# Patient Record
Sex: FEMALE | Race: WHITE | URBAN - METROPOLITAN AREA
[De-identification: names, ages, dates, MRNs, and addresses within clinical notes are randomized per-mention and may not be internally consistent; named-entity substitution may affect disease eponyms.]

---

## 2017-06-03 ENCOUNTER — HOSPITAL ENCOUNTER (EMERGENCY)
Facility: CLINIC | Age: 22
Discharge: HOME | End: 2017-06-04
Attending: EMERGENCY MEDICINE | Admitting: EMERGENCY MEDICINE
Payer: COMMERCIAL

## 2017-06-03 DIAGNOSIS — L50.9 HIVES: ICD-10-CM

## 2017-06-03 PROCEDURE — 99283 EMERGENCY DEPT VISIT LOW MDM: CPT

## 2017-06-03 PROCEDURE — 87081 CULTURE SCREEN ONLY: CPT | Performed by: EMERGENCY MEDICINE

## 2017-06-03 PROCEDURE — 87880 STREP A ASSAY W/OPTIC: CPT | Performed by: EMERGENCY MEDICINE

## 2017-06-03 RX ORDER — DIPHENHYDRAMINE HCL 25 MG
25 CAPSULE ORAL ONCE
Status: COMPLETED | OUTPATIENT
Start: 2017-06-03 | End: 2017-06-04

## 2017-06-03 RX ORDER — BIOTIN 10000 MCG
CAPSULE ORAL
COMMUNITY

## 2017-06-03 ASSESSMENT — ENCOUNTER SYMPTOMS
JOINT SWELLING: 1
SORE THROAT: 1
ARTHRALGIAS: 1
TROUBLE SWALLOWING: 1

## 2017-06-03 NOTE — ED AVS SNAPSHOT
Emergency Department    [address] [place] [address]    Phone:  [phone]    Fax:  [phone]                                       Gemma Quispe   MRN: [id number]    Department:   Emergency Department   Date of Visit:  6/3/2017           After Visit Summary Signature Page     I have received my discharge instructions, and my questions have been answered. I have discussed any challenges I see with this plan with the nurse or doctor.    ..........................................................................................................................................  Patient/Patient Representative Signature      ..........................................................................................................................................  Patient Representative Print Name and Relationship to Patient    ..................................................               ................................................  Date                                            Time    ..........................................................................................................................................  Reviewed by Signature/Title    ...................................................              ..............................................  Date                                                            Time

## 2017-06-03 NOTE — ED AVS SNAPSHOT
Emergency Department    [address] [facility] [address]    Phone:  [phone]    Fax:  [phone]                                       Gemma Quispe   MRN: [id number]    Department:   Emergency Department   Date of Visit:  6/3/2017           Patient Information     Date Of Birth          1995        Your diagnoses for this visit were:     Hives        You were seen by Ramila Hathaway MD.      Follow-up Information     Follow up with Gemma Higuera NP. Schedule an appointment as soon as possible for a visit in 2 days.    Contact information:    [address] [id number] [phone]          Discharge Instructions       *You may resume diet and activities as tolerated.  Avoid known allergens.  *Take medications as prescribed.  Prednisone and zantac as directed.  Benadryl as directed as needed. Continue your current medications.  *If you develop symptoms of anaphylaxis (throat swelling, cough, difficulty in breathing, ligthheaded), inject with your Epi-pen and return to the emergency department immediately.  *Return if you become worse in any way.      Discharge Instructions  Hives or Urticaria    Hives are a rash with raised, swollen, red, itchy spots on the skin. They may look like mosquito bites. Hives often change in size, shape and location over time.  Hives can be caused by an allergic reaction to medicine, food, insect stings, or many other things. They can also come because of your own body s reaction to things like infections, body temperature changes, or pressure on the skin. Sometimes hives are caused by an inherited problem. Hives are not contagious.    Return to the Emergency Department if:    You have trouble breathing.    Your lips or tongue swell up, or you have swelling or tightness in the throat.    You have stomach pain or vomiting.    You pass out.    What can I do to help myself?    Fill any prescriptions the doctor gave you and  take them right away.    You may be given a prescription for a steroid to reduce inflammation. Used long-term, these can have many serious side effects, but for short courses these do not happen. You may notice restlessness or increased appetite. Be sure to take all of the medicine as prescribed.     You may be given a prescription for an antihistamine, or your doctor may tell you to use one available without a prescription, such as Benadryl  (diphenhydramine). These medicines relieve the itching and can help make the hives go away.    Sometimes your doctor will recommend an H1 antihistamine, such as Zantac  (ranitidine) or Pepcid  (famotidine). These are usually used for stomach problems, but also can help with hives.     Avoid scratching! This makes the hives get worse. You may want to put socks on your hands (or on your child s hands) when sleeping.    Avoid tight clothes, or clothes that rub on your skin.    If the itching is too bad, try cool compresses or cool baths. Avoid hot baths and showers, because they can make hives worse.    If you have a serious allergic reaction, you may be given an epinephrine shot (like EpiPen ) to use at home. Use this if you are exposed to the thing you are allergic to, and call an ambulance right away. This medicine is used to buy time to get to a hospital, but not to replace hospital care.     Follow up with your regular doctor:    If your hives aren t gone in 4 or 5 days.    If you keep getting hives.  If you were given a prescription for medicine here today, be sure to read all of the information (including the package insert) that comes with your prescription.  This will include important information about the medicine, its side effects, and any warnings that you need to know about.  The pharmacist who fills the prescription can provide more information and answer questions you may have about the medicine.  If you have questions or concerns that the pharmacist cannot  address, please call or return to the Emergency Department.   Opioid Medication Information    Pain medications are among the most commonly prescribed medicines, so we are including this information for all our patients. If you did not receive pain medication or get a prescription for pain medicine, you can ignore it.     You may have been given a prescription for an opioid (narcotic) pain medicine and/or have received a pain medicine while here in the Emergency Department. These medicines can make you drowsy or impaired. You must not drive, operate dangerous equipment, or engage in any other dangerous activities while taking these medications. If you drive while taking these medications, you could be arrested for DUI, or driving under the influence. Do not drink any alcohol while you are taking these medications.     Opioid pain medications can cause addiction. If you have a history of chemical dependency of any type, you are at a higher risk of becoming addicted to pain medications.  Only take these prescribed medications to treat your pain when all other options have been tried. Take it for as short a time and as few doses as possible. Store your pain pills in a secure place, as they are frequently stolen and provide a dangerous opportunity for children or visitors in your house to start abusing these powerful medications. We will not replace any lost or stolen medicine.  As soon as your pain is better, you should flush all your remaining medication.     Many prescription pain medications contain Tylenol  (acetaminophen), including Vicodin , Tylenol #3 , Norco , Lortab , and Percocet .  You should not take any extra pills of Tylenol  if you are using these prescription medications or you can get very sick.  Do not ever take more than 3000 mg of acetaminophen in any 24 hour period.    All opioids tend to cause constipation. Drink plenty of water and eat foods that have a lot of fiber, such as fruits, vegetables,  prune juice, apple juice and high fiber cereal.  Take a laxative if you don t move your bowels at least every other day. Miralax , Milk of Magnesia, Colace , or Senna  can be used to keep you regular.      Remember that you can always come back to the Emergency Department if you are not able to see your regular doctor in the amount of time listed above, if you get any new symptoms, or if there is anything that worries you.      24 Hour Appointment Hotline       To make an appointment at any [facility], call [phone] ([phone]). If you don't have a family doctor or clinic, we will help you find one. [facility] clinics are conveniently located to serve the needs of you and your family.             Review of your medicines      START taking        Dose / Directions Last dose taken    diphenhydrAMINE 25 MG tablet   Commonly known as:  BENADRYL   Dose:  25 mg   Quantity:  56 tablet        Take 1 tablet (25 mg) by mouth every 6 hours as needed for itching   Refills:  0        EPINEPHrine 0.3 MG/0.3ML injection   Dose:  0.3 mg   Quantity:  0.6 mL        Inject 0.3 mLs (0.3 mg) into the muscle once as needed for anaphylaxis   Refills:  1        predniSONE 20 MG tablet   Commonly known as:  DELTASONE   Quantity:  10 tablet        Take two tablets (= 40mg) each day for 5 (five) days   Refills:  0        ranitidine 150 MG tablet   Commonly known as:  ZANTAC   Dose:  150 mg   Quantity:  20 tablet        Take 1 tablet (150 mg) by mouth 2 times daily for 10 days   Refills:  0          Our records show that you are taking the medicines listed below. If these are incorrect, please call your family doctor or clinic.        Dose / Directions Last dose taken    Biotin 10 MG Caps        Refills:  0        Fish Flavor Liqd        Refills:  0        HYDROXYZINE HCL PO        Refills:  0        SERTRALINE HCL PO        Take by mouth daily   Refills:  0        vitamin B complex with vitamin C Tabs tablet   Dose:  1 tablet         Take 1 tablet by mouth daily   Refills:  0        WELLBUTRIN PO        Refills:  0                Prescriptions were sent or printed at these locations (4 Prescriptions)                   Other Prescriptions                Printed at Department/Unit printer (4 of 4)         predniSONE (DELTASONE) 20 MG tablet               ranitidine (ZANTAC) 150 MG tablet               diphenhydrAMINE (BENADRYL) 25 MG tablet               EPINEPHrine 0.3 MG/0.3ML injection                Procedures and tests performed during your visit     Beta strep group A culture    Rapid strep screen      Orders Needing Specimen Collection     None      Pending Results     Date and Time Order Name Status Description    6/4/2017 2315 Beta strep group A culture In process             Pending Culture Results     Date and Time Order Name Status Description    6/4/2017 2315 Beta strep group A culture In process             Pending Results Instructions     If you had any lab results that were not finalized at the time of your Discharge, you can call the ED Lab Result RN at [phone]. You will be contacted by this team for any positive Lab results or changes in treatment. The nurses are available 7 days a week from 10A to 6:30P.  You can leave a message 24 hours per day and they will return your call.        Test Results From Your Hospital Stay        6/4/2017 12:00 AM      Component Results     Component    Specimen Description    Throat    Rapid Strep A Screen    NEGATIVE: No Group A streptococcal antigen detected by immunoassay, await   culture report.      Micro Report Status    FINAL 06/03/2017 6/4/2017 12:01 AM                Clinical Quality Measure: Blood Pressure Screening     Your blood pressure was checked while you were in the emergency department today. The last reading we obtained was  BP: 108/68 . Please read the guidelines below about what these numbers mean and what you should do about them.  If your systolic blood  "pressure (the top number) is less than 120 and your diastolic blood pressure (the bottom number) is less than 80, then your blood pressure is normal. There is nothing more that you need to do about it.  If your systolic blood pressure (the top number) is 120-139 or your diastolic blood pressure (the bottom number) is 80-89, your blood pressure may be higher than it should be. You should have your blood pressure rechecked within a year by a primary care provider.  If your systolic blood pressure (the top number) is 140 or greater or your diastolic blood pressure (the bottom number) is 90 or greater, you may have high blood pressure. High blood pressure is treatable, but if left untreated over time it can put you at risk for heart attack, stroke, or kidney failure. You should have your blood pressure rechecked by a primary care provider within the next 4 weeks.  If your provider in the emergency department today gave you specific instructions to follow-up with your doctor or provider even sooner than that, you should follow that instruction and not wait for up to 4 weeks for your follow-up visit.        Thank you for choosing [facility]       Thank you for choosing [facility] for your care. Our goal is always to provide you with excellent care. Hearing back from our patients is one way we can continue to improve our services. Please take a few minutes to complete the written survey that you may receive in the mail after you visit with us. Thank you!        [facility] Information     [facility] lets you send messages to your doctor, view your test results, renew your prescriptions, schedule appointments and more. To sign up, go to [email or web address] . Click on \"Log in\" on the left side of the screen, which will take you to the Welcome page. Then click on \"Sign up Now\" on the right side of the page.     You will be asked to enter the access code listed below, as well as some personal information. Please follow the " directions to create your username and password.     Your access code is: [email or web address]  Expires: 2017  1:10 AM     Your access code will  in 90 days. If you need help or a new code, please call your [facility] clinic or [phone].        Care EveryWhere ID     This is your Care EveryWhere ID. This could be used by other organizations to access your [facility] medical records  [id number]        After Visit Summary       This is your record. Keep this with you and show to your community pharmacist(s) and doctor(s) at your next visit.

## 2017-06-04 VITALS
OXYGEN SATURATION: 99 % | HEIGHT: 65 IN | SYSTOLIC BLOOD PRESSURE: 108 MMHG | BODY MASS INDEX: 19.99 KG/M2 | DIASTOLIC BLOOD PRESSURE: 68 MMHG | RESPIRATION RATE: 18 BRPM | TEMPERATURE: 98.7 F | WEIGHT: 120 LBS

## 2017-06-04 LAB
DEPRECATED S PYO AG THROAT QL EIA: NORMAL
MICRO REPORT STATUS: NORMAL
SPECIMEN SOURCE: NORMAL

## 2017-06-04 PROCEDURE — 25000132 ZZH RX MED GY IP 250 OP 250 PS 637: Performed by: EMERGENCY MEDICINE

## 2017-06-04 RX ORDER — PREDNISONE 20 MG/1
TABLET ORAL
Qty: 10 TABLET | Refills: 0 | Status: SHIPPED | OUTPATIENT
Start: 2017-06-04

## 2017-06-04 RX ORDER — DIPHENHYDRAMINE HCL 25 MG
25 TABLET ORAL EVERY 6 HOURS PRN
Qty: 56 TABLET | Refills: 0 | Status: SHIPPED | OUTPATIENT
Start: 2017-06-04

## 2017-06-04 RX ORDER — EPINEPHRINE 0.3 MG/.3ML
0.3 INJECTION SUBCUTANEOUS
Qty: 0.6 ML | Refills: 1 | Status: SHIPPED | OUTPATIENT
Start: 2017-06-04

## 2017-06-04 RX ADMIN — DIPHENHYDRAMINE HYDROCHLORIDE 25 MG: 25 CAPSULE ORAL at 00:07

## 2017-06-04 RX ADMIN — RANITIDINE 150 MG: 150 TABLET ORAL at 00:07

## 2017-06-04 NOTE — DISCHARGE INSTRUCTIONS
*You may resume diet and activities as tolerated.  Avoid known allergens.  *Take medications as prescribed.  Prednisone and zantac as directed.  Benadryl as directed as needed. Continue your current medications.  *If you develop symptoms of anaphylaxis (throat swelling, cough, difficulty in breathing, ligthheaded), inject with your Epi-pen and return to the emergency department immediately.  *Return if you become worse in any way.      Discharge Instructions  Hives or Urticaria    Hives are a rash with raised, swollen, red, itchy spots on the skin. They may look like mosquito bites. Hives often change in size, shape and location over time.  Hives can be caused by an allergic reaction to medicine, food, insect stings, or many other things. They can also come because of your own body s reaction to things like infections, body temperature changes, or pressure on the skin. Sometimes hives are caused by an inherited problem. Hives are not contagious.    Return to the Emergency Department if:    You have trouble breathing.    Your lips or tongue swell up, or you have swelling or tightness in the throat.    You have stomach pain or vomiting.    You pass out.    What can I do to help myself?    Fill any prescriptions the doctor gave you and take them right away.    You may be given a prescription for a steroid to reduce inflammation. Used long-term, these can have many serious side effects, but for short courses these do not happen. You may notice restlessness or increased appetite. Be sure to take all of the medicine as prescribed.     You may be given a prescription for an antihistamine, or your doctor may tell you to use one available without a prescription, such as Benadryl  (diphenhydramine). These medicines relieve the itching and can help make the hives go away.    Sometimes your doctor will recommend an H1 antihistamine, such as Zantac  (ranitidine) or Pepcid  (famotidine). These are usually used for stomach  problems, but also can help with hives.     Avoid scratching! This makes the hives get worse. You may want to put socks on your hands (or on your child s hands) when sleeping.    Avoid tight clothes, or clothes that rub on your skin.    If the itching is too bad, try cool compresses or cool baths. Avoid hot baths and showers, because they can make hives worse.    If you have a serious allergic reaction, you may be given an epinephrine shot (like EpiPen ) to use at home. Use this if you are exposed to the thing you are allergic to, and call an ambulance right away. This medicine is used to buy time to get to a hospital, but not to replace hospital care.     Follow up with your regular doctor:    If your hives aren t gone in 4 or 5 days.    If you keep getting hives.  If you were given a prescription for medicine here today, be sure to read all of the information (including the package insert) that comes with your prescription.  This will include important information about the medicine, its side effects, and any warnings that you need to know about.  The pharmacist who fills the prescription can provide more information and answer questions you may have about the medicine.  If you have questions or concerns that the pharmacist cannot address, please call or return to the Emergency Department.   Opioid Medication Information    Pain medications are among the most commonly prescribed medicines, so we are including this information for all our patients. If you did not receive pain medication or get a prescription for pain medicine, you can ignore it.     You may have been given a prescription for an opioid (narcotic) pain medicine and/or have received a pain medicine while here in the Emergency Department. These medicines can make you drowsy or impaired. You must not drive, operate dangerous equipment, or engage in any other dangerous activities while taking these medications. If you drive while taking these  medications, you could be arrested for DUI, or driving under the influence. Do not drink any alcohol while you are taking these medications.     Opioid pain medications can cause addiction. If you have a history of chemical dependency of any type, you are at a higher risk of becoming addicted to pain medications.  Only take these prescribed medications to treat your pain when all other options have been tried. Take it for as short a time and as few doses as possible. Store your pain pills in a secure place, as they are frequently stolen and provide a dangerous opportunity for children or visitors in your house to start abusing these powerful medications. We will not replace any lost or stolen medicine.  As soon as your pain is better, you should flush all your remaining medication.     Many prescription pain medications contain Tylenol  (acetaminophen), including Vicodin , Tylenol #3 , Norco , Lortab , and Percocet .  You should not take any extra pills of Tylenol  if you are using these prescription medications or you can get very sick.  Do not ever take more than 3000 mg of acetaminophen in any 24 hour period.    All opioids tend to cause constipation. Drink plenty of water and eat foods that have a lot of fiber, such as fruits, vegetables, prune juice, apple juice and high fiber cereal.  Take a laxative if you don t move your bowels at least every other day. Miralax , Milk of Magnesia, Colace , or Senna  can be used to keep you regular.      Remember that you can always come back to the Emergency Department if you are not able to see your regular doctor in the amount of time listed above, if you get any new symptoms, or if there is anything that worries you.

## 2017-06-04 NOTE — ED PROVIDER NOTES
"  History     Chief Complaint:  Joint Swelling, Sore throat, and Rash    HPI   Gemma Quispe is a 21 year old female who presents to the emergency department today for evaluation of joint swelling, a rash, and sore throat. The patient reports sunbathing and getting sunburnt on Thursday, after which her knees, ankles, and wrists have been swelling and hurting. She states that her sore throat began today, and that it hurts to swallow. She took benadryl today at 1000, 1200, and 1400, and also tried applying peppermint oil which seemed to help with the itching. She also notes strong urine and the possibility of abnormal discharge. She started wellbutrin a few weeks ago, but hasn't had problems with it before. She denies any recent travel, alcohol or drug use, or any history of allergic reactions. No other new medications or exposures she is aware of.     Allergies:  No Known Drug Allergies    Medications:    Sertraline  Hydroxyzine  Bupropion    Past Medical History:    ADHD  Anxiety  Depressive Disorder    Past Surgical History:    Tonsillectomy    Family History:    History reviewed. No pertinent family history.     Social History:  Smoking Status: No  Alcohol Use: No  Marital Status: Single     Review of Systems   HENT: Positive for sore throat and trouble swallowing.    Genitourinary: Positive for vaginal discharge.   Musculoskeletal: Positive for arthralgias and joint swelling.   Skin: Positive for rash.   All other systems reviewed and are negative.    Physical Exam   Vitals:  Patient Vitals for the past 24 hrs:   BP Temp Temp src Heart Rate Resp SpO2 Height Weight   06/04/17 0009 108/68 - - 91 18 99 % - -   06/03/17 2220 109/62 98.7  F (37.1  C) Temporal 74 20 100 % 1.651 m (5' 5\") 54.4 kg (120 lb)     Physical Exam  General: Well-nourished, actively scratching arms and wrists   Eyes: PERRL, conjunctivae pink no scleral icterus or conjunctival injection  ENT:  Moist mucus membranes, posterior oropharynx " "clear without erythema or exudates. Uvula midline without edema, no tongue/lip/oropharngeal swelling.  Mallampati I.  No stridor or wheezing.  Respiratory:  Lungs clear to auscultation bilaterally, no crackles/rubs/wheezes.  Good air movement  CV: Normal rate and rhythm, no murmurs/rubs/gallops  GI:  Abdomen soft and non-distended.  Normoactive BS.  No tenderness, guarding or rebound  Skin: Warm, dry.  No rashes or petechiae. +urticarial lesions over chest, hands, wrist, knees, ankles, abdomen.   Musculoskeletal: No peripheral edema or calf tenderness.  No appreciable joint effusion. No redness or warmth.  +urticarial lesions at location of the \"jount swelling\" on bilateral wrists, knees, ankles.   Neuro: Alert and oriented to person/place/time  Psychiatric: Normal affect    Emergency Department Course     Laboratory:  Laboratory findings were communicated with the patient who voiced understanding of the findings.    Rapid strep screen: Negative  Beta strep group A culture: Pending    Interventions:  0007 ranitidine 150 mg oral  0007 benadryl 25 mg oral     Emergency Department Course:  Nursing notes and vitals reviewed.  I performed an exam of the patient as documented above.   At 0220 the patient was rechecked and was updated on the results of her laboratory studies.  I discussed the treatment plan with the patient. She expressed understanding of this plan and consented to discharge. She will be discharged home with instructions for care and follow up. In addition, the patient will return to the emergency department if their symptoms persist, worsen, if new symptoms arise or if there is any concern.  All questions were answered.  I personally reviewed the laboratory results with the Patient and answered all related questions prior to discharge.    Impression & Plan      Medical Decision Making:  Gemma Quispe is a 21 year old female who presents with complaint of hives.  The patient has hives but no signs of " airway compromise or anaphylaxis.  There are no new exposures to suggest a specific allergan.  We treated with benadryl and zantac and observed for several hours.  At this point there are no signs of worsening and I believe the patient is safe for discharge home.  Rapid strep is negative.  She may have a viral pharyngitis.  She complained of joint pain but I don't appreciate any septic joints and the swelling she points to is at the location of urticarial lesions.  This would be inconsistent with disseminated gonoccocal disease given the rash is pruritic and the joint discomfort is symmetric. I discharged with prescriptions for benadryl, zantac, prednisone and epipen should she develop signs of anaphylaxis.  Recommended follow-up with PMD in 1-2 days for persistent symptoms and gave precautions to return if worsening.    Diagnosis:    ICD-10-CM    1. Hives L50.9      Disposition:   Home    Discharge Medications:  Discharge Medication List as of 6/4/2017  1:10 AM      START taking these medications    Details   predniSONE (DELTASONE) 20 MG tablet Take two tablets (= 40mg) each day for 5 (five) days, Disp-10 tablet, R-0, Local Print      ranitidine (ZANTAC) 150 MG tablet Take 1 tablet (150 mg) by mouth 2 times daily for 10 days, Disp-20 tablet, R-0, Local Print      diphenhydrAMINE (BENADRYL) 25 MG tablet Take 1 tablet (25 mg) by mouth every 6 hours as needed for itching, Disp-56 tablet, R-0, Local Print      EPINEPHrine 0.3 MG/0.3ML injection Inject 0.3 mLs (0.3 mg) into the muscle once as needed for anaphylaxis, Disp-0.6 mL, R-1, Local Print           Scribe Disclosure:  I, Demar Francois, am serving as a scribe at 11:17 PM on 6/3/2017 to document services personally performed by Ramila Hathaway MD, based on my observations and the provider's statements to me.    6/3/2017    EMERGENCY DEPARTMENT       Ramila Hathaway MD  06/04/17 2929

## 2017-06-06 LAB
BACTERIA SPEC CULT: NORMAL
MICRO REPORT STATUS: NORMAL
SPECIMEN SOURCE: NORMAL